# Patient Record
Sex: MALE | Race: WHITE | NOT HISPANIC OR LATINO | Employment: PART TIME | URBAN - METROPOLITAN AREA
[De-identification: names, ages, dates, MRNs, and addresses within clinical notes are randomized per-mention and may not be internally consistent; named-entity substitution may affect disease eponyms.]

---

## 2022-08-02 ENCOUNTER — OFFICE VISIT (OUTPATIENT)
Dept: HEMATOLOGY/ONCOLOGY | Facility: CLINIC | Age: 67
End: 2022-08-02
Payer: MEDICARE

## 2022-08-02 VITALS
WEIGHT: 182.5 LBS | HEART RATE: 63 BPM | TEMPERATURE: 98 F | OXYGEN SATURATION: 99 % | DIASTOLIC BLOOD PRESSURE: 83 MMHG | SYSTOLIC BLOOD PRESSURE: 164 MMHG | RESPIRATION RATE: 18 BRPM

## 2022-08-02 DIAGNOSIS — D75.1 SECONDARY ERYTHROCYTOSIS: ICD-10-CM

## 2022-08-02 DIAGNOSIS — G47.33 OBSTRUCTIVE SLEEP APNEA: ICD-10-CM

## 2022-08-02 PROCEDURE — 99214 PR OFFICE/OUTPT VISIT, EST, LEVL IV, 30-39 MIN: ICD-10-PCS | Mod: ,,, | Performed by: STUDENT IN AN ORGANIZED HEALTH CARE EDUCATION/TRAINING PROGRAM

## 2022-08-02 PROCEDURE — 99214 OFFICE O/P EST MOD 30 MIN: CPT | Mod: ,,, | Performed by: STUDENT IN AN ORGANIZED HEALTH CARE EDUCATION/TRAINING PROGRAM

## 2022-08-02 RX ORDER — METOPROLOL SUCCINATE 50 MG/1
50 TABLET, EXTENDED RELEASE ORAL DAILY
COMMUNITY
Start: 2022-06-25

## 2022-08-02 RX ORDER — LISINOPRIL 10 MG/1
10 TABLET ORAL DAILY
COMMUNITY
Start: 2022-06-25

## 2022-08-02 RX ORDER — HYDROCODONE BITARTRATE AND ACETAMINOPHEN 10; 325 MG/1; MG/1
1 TABLET ORAL EVERY 6 HOURS PRN
COMMUNITY
Start: 2022-07-08

## 2022-08-02 RX ORDER — ROSUVASTATIN CALCIUM 40 MG/1
40 TABLET, COATED ORAL DAILY
COMMUNITY
Start: 2022-06-27

## 2022-08-02 RX ORDER — EMPAGLIFLOZIN 10 MG/1
10 TABLET, FILM COATED ORAL DAILY
COMMUNITY
Start: 2022-06-27

## 2022-08-02 RX ORDER — EZETIMIBE 10 MG/1
5 TABLET ORAL DAILY
COMMUNITY
Start: 2022-04-26

## 2022-08-02 RX ORDER — CLOPIDOGREL BISULFATE 75 MG/1
75 TABLET ORAL DAILY
COMMUNITY
Start: 2022-06-25

## 2022-08-02 NOTE — PROGRESS NOTES
Chief complaint: polycythemia    HPI: 65 y/o M w/ PMHx of HTN, HLD, DM, CAD, TATIANA not on CPAP referred ot [facility] for polycythemia    Colonoscopy Dr Perales at age 52. Cologuard 2018  On initial visit with our clinic 4/2021 his wife reported he snored and had moments of gasping for air during sleep. He also had some pauses during sleep. He had not used CPAP for nearly 8 years. Of note, blood work that showed erythrocytosis was drawn while pt was ill. It was about 5-7 days after J&J covid19 vaccine, he felt weak, dizzy and fatigued, was in bed all day with minimal food and fluid intake.    Today 08/02/2022, patient denies any acute concerns.  He denies any new medications since his last follow-up visit with us.  He denies any symptoms of headaches, vision changes, dizziness, focal weakness, redness or burning of his extremities.    PMHx: HTN, HLD, DM, CAD, TATIANA not on CPAP, lyme disease  PSHx: PCI x2 LAD in 2014, tonsillectomy/adenoidectomy  Social Hx: former smoker 1ppd, quit in 1983, only smoked for 3 years, no ETOH, no drugs  Family Hx: Paternal uncle: unknown cancer  Meds: reviewed  Allergies: NDKA    Labs:  07/26/2022:  WBC 6 0.6 seconds, hemoglobin 16.2, MCV 91.5, platelet 265, .   3/29/22 WBC 6.98, Hgb 15.8, Hct 46.2, ,   10/26/21 Cr 1.03 Alb 4.2 TP 7.7 AlkPhos 23 Tbili 1.8 PSA 3.35 A1C 5.8 WBC 7.39 RBC 5.49 Hg 17.4 HCT 50.9  ANC 3.78  7/27/21 Cr 1.02, Ab 4.1, WBC 6.69, RBC 5.88, Hg 17.9, MCV 91.2, RDW 12.3, , ANC 3.41, , Retic 1.31  4/23/21 Peripheral blood: karyotype 46XY, FISH MPN negative (BCR/ABL, FGDGR1, PDGFRA, PDGFRB, CEP8, CEP9, 20q, CEP7, 13q, 4q).  neg, JAK2 V617F neg, Expanded JAK2 neg, CALR neg  4/23/21 Cr 0.81 Ab 4 WBC 5.48 RBC 5.37 Hg 16.9 MCV 91.4  ANC 2.68 Retic abs 84k Test 529 SPEP w/ MYNOR normal  4/7/21 WBC 5.34 RBC 5.15 Hg 16.23 HCT 48.3 MCV 93.8 RDW 13.5  ANC 2.4 ALC 2.12 AMC 0.68  3/17/21 JAK2 exon 12 neg EPO level 5 Cr 0.88  Alb 4.5 TP 7.9 Ca 9.8 AlkPhos 25 AST 28 ALT 56 TSH 1.62 WBC 9.99 Hg 18.7 RBC 6.01 HCT 55 MCV 91.5 RDW 12.7   10/13/17 WBC 6.7 Hg 16.4   8/13/14 WBC 12.7 Hg 15     Imaging:  3/17/21 CT head non contrast: unremarkable  12/8/20 MRI C spine w/o contrast: left paracentral to lateral disc herniation C5-6 mildly narrowing the left lateral recess and mod narrowing the left lateral foramen. Minimal left paracentral disc protrusion/herniation at C7=-TR1 mildly impinging on thecal space in left lateral recess    Path: None     Review of Systems     CONSTITUTIONAL: no fevers, no chills, no weight loss, no fatigue, no weakness  HEMATOLOGIC: no abnormal bleeding, no abnormal bruising, no drenching night sweats  ONCOLOGIC: no new masses or lumps  HEENT: no vision loss, no tinnitus or hearing loss, no nose bleeding, no dysphagia, no odynophagia  CVS: no chest pain, no palpitations, no dyspnea on exertion  RESP: no shortness of breath, no hemoptysis, no cough  GI: no nausea, no vomiting, no diarrhea, no constipation, no melena, no hematochezia, no hematemesis, no abdominal pain, no increase in abdominal girth  : no dysuria, no hematuria, no hesitancy, no scrotal swelling, no discharge  INTEGUMENT: no rashes, no abnormal bruising, no nail pitting, no hyperpigmentation  NEURO: no falls, no memory loss, no paresthesias or dysesthesias, no urofecal incontinence or retention, no loss of strength on any extremity  MSK: no back pain, no new joint pain, no joint swelling  PSYCH: no suicidal or homicidal ideation, no depression, no insomnia, no anhedonia  ENDOCRINE: no heat or cold intolerance, no polyuria, no polydipsia     Physical Exam   Vitals:    08/02/22 1408   BP: (!) 164/83   Pulse: 63   Resp: 18   Temp: 98 °F (36.7 °C)       ECOG PS 0  GA: AAOx3, NAD  HEENT: NCAT, PERRLA, EOMI, good dentition, no oral ulcers, short neck, pharyngeal crowding.  LYMPH: no cervical, axillary or supraclavicular adenopathy  CVS:  s1s2 RRR, no M/R/G  RESP: CTA b/l, no crackles, no wheezes or rhonchi  ABD: soft, NT, ND, BS+, no hepatosplenomegaly  EXT: no deformities, no pedal edema  SKIN: no rashes, no bruises or purpura, warm and dry  NEURO: normal mentation, strength 5/5 on all 4 extremities, no sensory deficits       Assessment/Plan     1. Polycythemia, reactive.  MPN testing including karyotype, FISH and CALR, MPL, JAK2 V617 and JAK2 Ex 12-15 negative. Other workup including SPEP, MYNOR, sflc, Testosterone level all WNL  Etiology of polycythemia is likely TATIANA while not using CPAP. Essentially secondary polycythemia  Other possible etiology of polycythemia may include Jardiance (common side effect is hemoconcentration).  No strict guidelines for phlebotomy in secondary erythrocytosis but will likely phlebotomize for Hg >18 g/dl  He has been compliant with CPAP, hgb WNL   He is already on dual antiplatelets for CAD    2. Sleep apnea G47.30 Extended hx of TATIANA, c/w use of CPAP at night      Plan  # CBC, CMP in 6 months  # follow-up in clinic in 6 months to discuss above workup and further disease management.

## 2023-01-31 ENCOUNTER — OFFICE VISIT (OUTPATIENT)
Dept: HEMATOLOGY/ONCOLOGY | Facility: CLINIC | Age: 68
End: 2023-01-31
Payer: MEDICARE

## 2023-01-31 VITALS
DIASTOLIC BLOOD PRESSURE: 80 MMHG | TEMPERATURE: 97 F | OXYGEN SATURATION: 97 % | SYSTOLIC BLOOD PRESSURE: 128 MMHG | HEIGHT: 66 IN | RESPIRATION RATE: 18 BRPM | BODY MASS INDEX: 30.52 KG/M2 | HEART RATE: 62 BPM | WEIGHT: 189.88 LBS

## 2023-01-31 DIAGNOSIS — G47.33 OBSTRUCTIVE SLEEP APNEA: ICD-10-CM

## 2023-01-31 DIAGNOSIS — D75.1 SECONDARY ERYTHROCYTOSIS: Primary | ICD-10-CM

## 2023-01-31 PROCEDURE — 99214 OFFICE O/P EST MOD 30 MIN: CPT | Mod: ,,,

## 2023-01-31 PROCEDURE — 99214 PR OFFICE/OUTPT VISIT, EST, LEVL IV, 30-39 MIN: ICD-10-PCS | Mod: ,,,

## 2023-01-31 RX ORDER — ASPIRIN 81 MG/1
81 TABLET ORAL DAILY
COMMUNITY

## 2023-01-31 RX ORDER — AMOXICILLIN 500 MG
CAPSULE ORAL DAILY
COMMUNITY

## 2023-01-31 NOTE — PROGRESS NOTES
Chief complaint: polycythemia    HPI: 65 y/o M w/ PMHx of HTN, HLD, DM, CAD, TATIANA not on CPAP referred ot [facility] for polycythemia    Colonoscopy Dr Perales at age 52. [place] 2018  On initial visit with our clinic 4/2021 his wife reported he snored and had moments of gasping for air during sleep. He also had some pauses during sleep. He had not used CPAP for nearly 8 years. Of note, blood work that showed erythrocytosis was drawn while pt was ill. It was about 5-7 days after J&J covid19 vaccine, he felt weak, dizzy and fatigued, was in bed all day with minimal food and fluid intake.    Today 01/31/23, patient denies any acute concerns.  He denies any new medications since his last follow-up visit with us.  He denies any symptoms of headaches, vision changes, dizziness, focal weakness, redness or burning of his extremities. He reports nightly use of his CPAP.    PMHx: HTN, HLD, DM, CAD, TATIANA not on CPAP, lyme disease  PSHx: PCI x2 LAD in 2014, tonsillectomy/adenoidectomy  Social Hx: former smoker 1ppd, quit in 1983, only smoked for 3 years, no ETOH, no drugs  Family Hx: Paternal uncle: unknown cancer  Meds: reviewed  Allergies: NDKA    Labs:  01/24/23: Cr 1.07, ac 9.1, alb 4.2, alkphos 25, TB 2.0, LFTs WNL, wbc 7.00, hgb 17.4, HCT 52.1, plt 229, ANC 6.31  07/26/2022:  WBC 6 0.6 seconds, hemoglobin 16.2, MCV 91.5, platelet 265, .   3/29/22 WBC 6.98, Hgb 15.8, Hct 46.2, ,   10/26/21 Cr 1.03 Alb 4.2 TP 7.7 AlkPhos 23 Tbili 1.8 PSA 3.35 A1C 5.8 WBC 7.39 RBC 5.49 Hg 17.4 HCT 50.9  ANC 3.78  7/27/21 Cr 1.02, Ab 4.1, WBC 6.69, RBC 5.88, Hg 17.9, MCV 91.2, RDW 12.3, , ANC 3.41, , Retic 1.31  4/23/21 Peripheral blood: karyotype 46XY, FISH MPN negative (BCR/ABL, FGDGR1, PDGFRA, PDGFRB, CEP8, CEP9, 20q, CEP7, 13q, 4q).  neg, JAK2 V617F neg, Expanded JAK2 neg, CALR neg  4/23/21 Cr 0.81 Ab 4 WBC 5.48 RBC 5.37 Hg 16.9 MCV 91.4  ANC 2.68 Retic abs 84k Test 529 SPEP w/ MYNOR  normal  4/7/21 WBC 5.34 RBC 5.15 Hg 16.23 HCT 48.3 MCV 93.8 RDW 13.5  ANC 2.4 ALC 2.12 AMC 0.68  3/17/21 JAK2 exon 12 neg EPO level 5 Cr 0.88 Alb 4.5 TP 7.9 Ca 9.8 AlkPhos 25 AST 28 ALT 56 TSH 1.62 WBC 9.99 Hg 18.7 RBC 6.01 HCT 55 MCV 91.5 RDW 12.7   10/13/17 WBC 6.7 Hg 16.4   8/13/14 WBC 12.7 Hg 15     Imaging:  3/17/21 CT head non contrast: unremarkable  12/8/20 MRI C spine w/o contrast: left paracentral to lateral disc herniation C5-6 mildly narrowing the left lateral recess and mod narrowing the left lateral foramen. Minimal left paracentral disc protrusion/herniation at C7=-TR1 mildly impinging on thecal space in left lateral recess    Path: None     Review of Systems     CONSTITUTIONAL: no fevers, no chills, no weight loss, no fatigue, no weakness  HEMATOLOGIC: no abnormal bleeding, no abnormal bruising, no drenching night sweats  ONCOLOGIC: no new masses or lumps  HEENT: no vision loss, no tinnitus or hearing loss, no nose bleeding, no dysphagia, no odynophagia  CVS: no chest pain, no palpitations, no dyspnea on exertion  RESP: no shortness of breath, no hemoptysis, no cough  GI: no nausea, no vomiting, no diarrhea, no constipation, no melena, no hematochezia, no hematemesis, no abdominal pain, no increase in abdominal girth  : no dysuria, no hematuria, no hesitancy, no scrotal swelling, no discharge  INTEGUMENT: no rashes, no abnormal bruising, no nail pitting, no hyperpigmentation  NEURO: no falls, no memory loss, no paresthesias or dysesthesias, no urofecal incontinence or retention, no loss of strength on any extremity  MSK: no back pain, no new joint pain, no joint swelling  PSYCH: no suicidal or homicidal ideation, no depression, no insomnia, no anhedonia  ENDOCRINE: no heat or cold intolerance, no polyuria, no polydipsia     Physical Exam   Vitals:    01/31/23 1356   BP: 128/80   Pulse: 62   Resp: 18   Temp: 97.4 °F (36.3 °C)         ECOG PS 0  GA: AAOx3, NAD  HEENT: NCAT,  PERRLA, EOMI, good dentition, no oral ulcers, short neck, pharyngeal crowding.  LYMPH: no cervical, axillary or supraclavicular adenopathy  CVS: s1s2 RRR, no M/R/G  RESP: CTA b/l, no crackles, no wheezes or rhonchi  ABD: soft, NT, ND, BS+, no hepatosplenomegaly  EXT: no deformities, no pedal edema  SKIN: no rashes, no bruises or purpura, warm and dry  NEURO: normal mentation, strength 5/5 on all 4 extremities, no sensory deficits       Assessment/Plan     1. Polycythemia, reactive.  MPN testing including karyotype, FISH and CALR, MPL, JAK2 V617 and JAK2 Ex 12-15 negative. Other workup including SPEP, MYNOR, sflc, Testosterone level all WNL  Etiology of polycythemia is likely TATIANA while not using CPAP. Essentially secondary polycythemia  Other possible etiology of polycythemia may include Jardiance (common side effect is hemoconcentration).  No strict guidelines for phlebotomy in secondary erythrocytosis but will likely phlebotomize for Hg >18 g/dl  He has been compliant with CPAP, hgb WNL   He is already on dual antiplatelets for CAD    2. Sleep apnea G47.30 Extended hx of TATIANA, c/w use of CPAP at night      Plan  CBC, CMP in 6 months  Continue w/ CPAP nightly

## 2023-08-04 ENCOUNTER — OFFICE VISIT (OUTPATIENT)
Dept: HEMATOLOGY/ONCOLOGY | Facility: CLINIC | Age: 68
End: 2023-08-04
Payer: MEDICARE

## 2023-08-04 VITALS — WEIGHT: 178 LBS | BODY MASS INDEX: 28.61 KG/M2 | HEIGHT: 66 IN

## 2023-08-04 DIAGNOSIS — G47.33 OBSTRUCTIVE SLEEP APNEA: ICD-10-CM

## 2023-08-04 DIAGNOSIS — D75.1 SECONDARY ERYTHROCYTOSIS: Primary | ICD-10-CM

## 2023-08-04 PROCEDURE — 99442 PR PHYSICIAN TELEPHONE EVALUATION 11-20 MIN: CPT | Mod: 95,,,

## 2023-08-04 PROCEDURE — 99442 PR PHYSICIAN TELEPHONE EVALUATION 11-20 MIN: ICD-10-PCS | Mod: 95,,,

## 2023-08-04 NOTE — PROGRESS NOTES
Established Patient - Audio Only Telehealth Visit     The patient location is: Home  The chief complaint leading to consultation is: Reactive polycythemia secondary to TATIANA  Visit type: Virtual visit with audio only (telephone)  Total time spent with patient: 15 minutes       The reason for the audio only service rather than synchronous audio and video virtual visit was related to technical difficulties or patient preference/necessity.     Each patient to whom I provide medical services by telemedicine is:  (1) informed of the relationship between the physician and patient and the respective role of any other health care provider with respect to management of the patient; and (2) notified that they may decline to receive medical services by telemedicine and may withdraw from such care at any time. Patient verbally consented to receive this service via voice-only telephone call.         Chief complaint: polycythemia    HPI: 65 y/o M w/ PMHx of HTN, HLD, DM, CAD, TATIANA not on CPAP referred ot [facility] for polycythemia    Colonoscopy Dr Perales at age 52. Cologuard 2018  On initial visit with our clinic 4/2021 his wife reported he snored and had moments of gasping for air during sleep. He also had some pauses during sleep. He had not used CPAP for nearly 8 years. Of note, blood work that showed erythrocytosis was drawn while pt was ill. It was about 5-7 days after J&J covid19 vaccine, he felt weak, dizzy and fatigued, was in bed all day with minimal food and fluid intake.    Today 08/04/23, patient denies any acute concerns.  He denies any new medications since his last follow-up visit with us.  He denies any symptoms of headaches, vision changes, dizziness, focal weakness, redness or burning of his extremities. He reports nightly use of his CPAP.    PMHx: HTN, HLD, DM, CAD, TATIANA not on CPAP, lyme disease  PSHx: PCI x2 LAD in 2014, tonsillectomy/adenoidectomy  Social Hx: former smoker 1ppd, quit in 1983, only smoked for 3  years, no ETOH, no drugs  Family Hx: Paternal uncle: unknown cancer  Meds: reviewed  Allergies: NDKA    Labs:  08/01/23: CMP unremarkable, wbc 7.16, hgb 16.5, plt 246  01/24/23: Cr 1.07, ac 9.1, alb 4.2, alkphos 25, TB 2.0, LFTs WNL, wbc 7.00, hgb 17.4, HCT 52.1, plt 229, ANC 6.31  07/26/2022:  WBC 6 0.6 seconds, hemoglobin 16.2, MCV 91.5, platelet 265, .   3/29/22 WBC 6.98, Hgb 15.8, Hct 46.2, ,   10/26/21 Cr 1.03 Alb 4.2 TP 7.7 AlkPhos 23 Tbili 1.8 PSA 3.35 A1C 5.8 WBC 7.39 RBC 5.49 Hg 17.4 HCT 50.9  ANC 3.78  7/27/21 Cr 1.02, Ab 4.1, WBC 6.69, RBC 5.88, Hg 17.9, MCV 91.2, RDW 12.3, , ANC 3.41, , Retic 1.31  4/23/21 Peripheral blood: karyotype 46XY, FISH MPN negative (BCR/ABL, FGDGR1, PDGFRA, PDGFRB, CEP8, CEP9, 20q, CEP7, 13q, 4q).  neg, JAK2 V617F neg, Expanded JAK2 neg, CALR neg  4/23/21 Cr 0.81 Ab 4 WBC 5.48 RBC 5.37 Hg 16.9 MCV 91.4  ANC 2.68 Retic abs 84k Test 529 SPEP w/ MYNOR normal  4/7/21 WBC 5.34 RBC 5.15 Hg 16.23 HCT 48.3 MCV 93.8 RDW 13.5  ANC 2.4 ALC 2.12 AMC 0.68  3/17/21 JAK2 exon 12 neg EPO level 5 Cr 0.88 Alb 4.5 TP 7.9 Ca 9.8 AlkPhos 25 AST 28 ALT 56 TSH 1.62 WBC 9.99 Hg 18.7 RBC 6.01 HCT 55 MCV 91.5 RDW 12.7   10/13/17 WBC 6.7 Hg 16.4   8/13/14 WBC 12.7 Hg 15     Imaging:  3/17/21 CT head non contrast: unremarkable  12/8/20 MRI C spine w/o contrast: left paracentral to lateral disc herniation C5-6 mildly narrowing the left lateral recess and mod narrowing the left lateral foramen. Minimal left paracentral disc protrusion/herniation at C7=-TR1 mildly impinging on thecal space in left lateral recess    Path: None     Review of Systems     CONSTITUTIONAL: no fevers, no chills, no weight loss, no fatigue, no weakness  HEMATOLOGIC: no abnormal bleeding, no abnormal bruising, no drenching night sweats  ONCOLOGIC: no new masses or lumps  HEENT: no vision loss, no tinnitus or hearing loss, no nose bleeding, no dysphagia,  no odynophagia  CVS: no chest pain, no palpitations, no dyspnea on exertion  RESP: no shortness of breath, no hemoptysis, no cough  GI: no nausea, no vomiting, no diarrhea, no constipation, no melena, no hematochezia, no hematemesis, no abdominal pain, no increase in abdominal girth  : no dysuria, no hematuria, no hesitancy, no scrotal swelling, no discharge  INTEGUMENT: no rashes, no abnormal bruising, no nail pitting, no hyperpigmentation  NEURO: no falls, no memory loss, no paresthesias or dysesthesias, no urofecal incontinence or retention, no loss of strength on any extremity  MSK: no back pain, no new joint pain, no joint swelling  PSYCH: no suicidal or homicidal ideation, no depression, no insomnia, no anhedonia  ENDOCRINE: no heat or cold intolerance, no polyuria, no polydipsia     Physical Exam   There were no vitals filed for this visit.        ECOG PS 0  GA: AAOx3, NAD  HEENT: NCAT, PERRLA, EOMI, good dentition, no oral ulcers, short neck, pharyngeal crowding.  LYMPH: no cervical, axillary or supraclavicular adenopathy  CVS: s1s2 RRR, no M/R/G  RESP: CTA b/l, no crackles, no wheezes or rhonchi  ABD: soft, NT, ND, BS+, no hepatosplenomegaly  EXT: no deformities, no pedal edema  SKIN: no rashes, no bruises or purpura, warm and dry  NEURO: normal mentation, strength 5/5 on all 4 extremities, no sensory deficits       Assessment/Plan     1. Polycythemia, reactive.  MPN testing including karyotype, FISH and CALR, MPL, JAK2 V617 and JAK2 Ex 12-15 negative. Other workup including SPEP, MYNOR, sflc, Testosterone level all WNL  Etiology of polycythemia is likely TATIANA while not using CPAP. Essentially secondary polycythemia  Other possible etiology of polycythemia may include Jardiance (common side effect is hemoconcentration).  No strict guidelines for phlebotomy in secondary erythrocytosis but will likely phlebotomize for Hg >18 g/dl  He has been compliant with CPAP, hgb WNL   He is already on dual antiplatelets  for CAD    2. Sleep apnea G47.30 Extended hx of TATIANA, c/w use of CPAP at night      Plan  RTC 6 months w/ cbc cmp  Recommend he continue w/ CPAP nightly               This service was not originating from a related E/M service provided within the previous 7 days nor will  to an E/M service or procedure within the next 24 hours or my soonest available appointment.  Prevailing standard of care was able to be met in this audio-only visit.

## 2024-02-15 ENCOUNTER — OFFICE VISIT (OUTPATIENT)
Dept: HEMATOLOGY/ONCOLOGY | Facility: CLINIC | Age: 69
End: 2024-02-15
Payer: MEDICARE

## 2024-02-15 VITALS
HEART RATE: 60 BPM | RESPIRATION RATE: 18 BRPM | SYSTOLIC BLOOD PRESSURE: 128 MMHG | WEIGHT: 186 LBS | DIASTOLIC BLOOD PRESSURE: 77 MMHG | TEMPERATURE: 98 F | BODY MASS INDEX: 29.89 KG/M2 | OXYGEN SATURATION: 96 % | HEIGHT: 66 IN

## 2024-02-15 DIAGNOSIS — G47.33 OBSTRUCTIVE SLEEP APNEA: ICD-10-CM

## 2024-02-15 DIAGNOSIS — D75.1 SECONDARY ERYTHROCYTOSIS: Primary | ICD-10-CM

## 2024-02-15 PROCEDURE — 99213 OFFICE O/P EST LOW 20 MIN: CPT | Mod: ,,, | Performed by: STUDENT IN AN ORGANIZED HEALTH CARE EDUCATION/TRAINING PROGRAM

## 2024-02-15 NOTE — PROGRESS NOTES
Chief complaint:   Chief Complaint   Patient presents with    Follow-up         HPI: 65 y/o M w/ PMHx of HTN, HLD, DM, CAD, TATIANA not on CPAP referred ot [facility] for polycythemia    Colonoscopy Dr Perales at age 52. Cologuard 2018  On initial visit with our clinic 4/2021 his wife reported he snored and had moments of gasping for air during sleep. He also had some pauses during sleep. He had not used CPAP for nearly 8 years. Of note, blood work that showed erythrocytosis was drawn while pt was ill. It was about 5-7 days after J&J covid19 vaccine, he felt weak, dizzy and fatigued, was in bed all day with minimal food and fluid intake.        Interval history     Today, 02/15/2024, he denies any acute concerns today.  He reports compliance with CPAP.  He notices difference in his energy levels after being compliant with CPAP.  He denies any new medications, ER or hospital visits.  He denies any headaches, vision changes, chest tightness, itching after hot shower or burning numbness in his extremities.    Labs:    02/12/2024 WBC count 6.4, hemoglobin 15.5, hematocrit 46.5, platelet count 229.    08/01/23: CMP unremarkable, wbc 7.16, hgb 16.5, plt 246  01/24/23: Cr 1.07, ac 9.1, alb 4.2, alkphos 25, TB 2.0, LFTs WNL, wbc 7.00, hgb 17.4, HCT 52.1, plt 229, ANC 6.31  07/26/2022:  WBC 6 0.6 seconds, hemoglobin 16.2, MCV 91.5, platelet 265, .   3/29/22 WBC 6.98, Hgb 15.8, Hct 46.2, ,   10/26/21 Cr 1.03 Alb 4.2 TP 7.7 AlkPhos 23 Tbili 1.8 PSA 3.35 A1C 5.8 WBC 7.39 RBC 5.49 Hg 17.4 HCT 50.9  ANC 3.78  7/27/21 Cr 1.02, Ab 4.1, WBC 6.69, RBC 5.88, Hg 17.9, MCV 91.2, RDW 12.3, , ANC 3.41, , Retic 1.31  4/23/21 Peripheral blood: karyotype 46XY, FISH MPN negative (BCR/ABL, FGDGR1, PDGFRA, PDGFRB, CEP8, CEP9, 20q, CEP7, 13q, 4q).  neg, JAK2 V617F neg, Expanded JAK2 neg, CALR neg  4/23/21 Cr 0.81 Ab 4 WBC 5.48 RBC 5.37 Hg 16.9 MCV 91.4  ANC 2.68 Retic abs 84k Test 529 SPEP w/  MYNOR normal  4/7/21 WBC 5.34 RBC 5.15 Hg 16.23 HCT 48.3 MCV 93.8 RDW 13.5  ANC 2.4 ALC 2.12 AMC 0.68  3/17/21 JAK2 exon 12 neg EPO level 5 Cr 0.88 Alb 4.5 TP 7.9 Ca 9.8 AlkPhos 25 AST 28 ALT 56 TSH 1.62 WBC 9.99 Hg 18.7 RBC 6.01 HCT 55 MCV 91.5 RDW 12.7   10/13/17 WBC 6.7 Hg 16.4   8/13/14 WBC 12.7 Hg 15     Imaging:  3/17/21 CT head non contrast: unremarkable  12/8/20 MRI C spine w/o contrast: left paracentral to lateral disc herniation C5-6 mildly narrowing the left lateral recess and mod narrowing the left lateral foramen. Minimal left paracentral disc protrusion/herniation at C7=-TR1 mildly impinging on thecal space in left lateral recess    Path: None     Past Medical History:   Diagnosis Date    Hyperlipidemia     Hypertension        Past Surgical History:   Procedure Laterality Date    ROTATOR CUFF REPAIR Right     SHOULDER SURGERY         Family History   Problem Relation Age of Onset    Heart disease Father        Social History     Socioeconomic History    Marital status:    Tobacco Use    Smoking status: Never    Smokeless tobacco: Never   Substance and Sexual Activity    Alcohol use: Not Currently       Current Outpatient Medications   Medication Sig Dispense Refill    aspirin (ECOTRIN) 81 MG EC tablet Take 81 mg by mouth once daily.      clopidogreL (PLAVIX) 75 mg tablet Take 75 mg by mouth once daily.      ezetimibe (ZETIA) 10 mg tablet Take 5 mg by mouth once daily.      JARDIANCE 10 mg tablet Take 10 mg by mouth once daily.      lisinopriL 10 MG tablet Take 10 mg by mouth once daily.      metoprolol succinate (TOPROL-XL) 50 MG 24 hr tablet Take 50 mg by mouth once daily.      omega-3 fatty acids/fish oil (FISH OIL-OMEGA-3 FATTY ACIDS) 300-1,000 mg capsule Take by mouth once daily.      rosuvastatin (CRESTOR) 40 MG Tab Take 40 mg by mouth once daily.      alendronate-vitamin D3 (FOSAMAX PLUS D) 70 mg- 2,800 unit per tablet Take 1 tablet by mouth every 7 days.       HYDROcodone-acetaminophen (NORCO)  mg per tablet Take 1 tablet by mouth every 6 (six) hours as needed.       No current facility-administered medications for this visit.       Review of patient's allergies indicates:  No Known Allergies      Review of Systems     CONSTITUTIONAL: no fevers, no chills, no weight loss, no fatigue, no weakness  HEMATOLOGIC: no abnormal bleeding, no abnormal bruising, no drenching night sweats  ONCOLOGIC: no new masses or lumps  HEENT: no vision loss, no tinnitus or hearing loss, no nose bleeding, no dysphagia, no odynophagia  CVS: no chest pain, no palpitations, no dyspnea on exertion  RESP: no shortness of breath, no hemoptysis, no cough  GI: no nausea, no vomiting, no diarrhea, no constipation, no melena, no hematochezia, no hematemesis, no abdominal pain, no increase in abdominal girth  : no dysuria, no hematuria, no hesitancy, no scrotal swelling, no discharge  INTEGUMENT: no rashes, no abnormal bruising, no nail pitting, no hyperpigmentation  NEURO: no falls, no memory loss, no paresthesias or dysesthesias, no urofecal incontinence or retention, no loss of strength on any extremity  MSK: no back pain, no new joint pain, no joint swelling  PSYCH: no suicidal or homicidal ideation, no depression, no insomnia, no anhedonia  ENDOCRINE: no heat or cold intolerance, no polyuria, no polydipsia     Physical Exam   Vitals:    02/15/24 1439   BP: 128/77   Pulse: 60   Resp: 18   Temp: 98.4 °F (36.9 °C)       ECOG PS 0  GA: AAOx3, NAD  HEENT: NCAT, PERRLA, EOMI, good dentition, no oral ulcers, short neck, pharyngeal crowding.  LYMPH: no cervical, axillary or supraclavicular adenopathy  CVS: s1s2 RRR, no M/R/G  RESP: CTA b/l, no crackles, no wheezes or rhonchi  ABD: soft, NT, ND, BS+, no hepatosplenomegaly  EXT: no deformities, no pedal edema  SKIN: no rashes, no bruises or purpura, warm and dry  NEURO: normal mentation, strength 5/5 on all 4 extremities, no sensory deficits        Assessment/Plan     1. Polycythemia, reactive.  MPN testing including karyotype, FISH and CALR, MPL, JAK2 V617 and JAK2 Ex 12-15 negative. Other workup including SPEP, MYNOR, sflc, Testosterone level all WNL  Etiology of polycythemia is likely TATIANA while not using CPAP. Essentially secondary polycythemia  No strict guidelines for phlebotomy in secondary erythrocytosis but will likely phlebotomize for Hg >18 g/dl  He has been compliant with CPAP, hgb WNL   He is already on dual antiplatelets for CAD    2. Sleep apnea G47.30 Extended hx of TATIANA, c/w use of CPAP at night      Plan  Patient's erythrocytosis has resolved with CPAP use   Patient can be discharged from our clinic, has close follow-up with PCP   Follow-up in clinic p.r.n..      A total of  20 minutes were spent in review of records, interpretation of test, coordination of care, discussion and counseling with the patient.        Portions of the record may have been created with voice recognition software. Occasional wrong-word or sound-a-like substitutions may have occurred due to the inherent limitations of voice recognition software.

## 2024-09-13 DIAGNOSIS — M71.30 SYNOVIAL CYST: ICD-10-CM

## 2024-09-13 DIAGNOSIS — M48.00 CENTRAL STENOSIS OF SPINAL CANAL: Primary | ICD-10-CM

## 2024-09-16 ENCOUNTER — TELEPHONE (OUTPATIENT)
Dept: NEUROSURGERY | Facility: CLINIC | Age: 69
End: 2024-09-16
Payer: MEDICARE

## 2024-09-16 NOTE — TELEPHONE ENCOUNTER
"I received the referral to Dr. Jones from Dr. Tariq for central stenosis of spinal canal. I spoke with the patient to inquire about the pain he is having. He is C/O lower back pain that radiates down his left leg, into his toes. He has numbness in his toes and a sharp, stabbing pain in his back. The pain is not constant, it really depends on what he is doing. This pain has been going on for quite a while now but has gotten progressively worse in the past month or so. This pain is not due to any accidents/falls. The pain does sometimes wake him up at night, depending what activities he did for that day. When the pain is really bad, he would rate it a 9-10/10 but is usually a 2/10 on a daily basis. He is only taking Aleve to relieve the pain. He has not had any recent testing since MRI in 8/2024. Denies seeing any other doctors for this and has not had any surgeries on his neck/back. He has had a couple of injections in the past but cannot recall who gave them to him. Flores, by reviewing the MRI report, " at L4-L5 there is a minimal to mild disc bulge which is slightly asymmetric to the right and lateral/foraminal, possibly contacting the right exiting nerve root. There is bilateral facet and liagmentum flavum hypertrophy with minimal to mild central canal stenosis. Along the medial aspect of the left facet there is a 5 mm T1 and T2 hyperintense structure felt to be most consistent with a synovial cyst. This may contact the left originating nerve root. " Please review and advise on scheduling. Thank you!  "

## 2024-09-24 ENCOUNTER — OFFICE VISIT (OUTPATIENT)
Dept: NEUROSURGERY | Facility: CLINIC | Age: 69
End: 2024-09-24
Payer: MEDICARE

## 2024-09-24 VITALS
WEIGHT: 186 LBS | DIASTOLIC BLOOD PRESSURE: 74 MMHG | SYSTOLIC BLOOD PRESSURE: 133 MMHG | RESPIRATION RATE: 16 BRPM | HEIGHT: 66 IN | HEART RATE: 55 BPM | BODY MASS INDEX: 29.89 KG/M2

## 2024-09-24 DIAGNOSIS — R29.2 HYPERREFLEXIA OF LOWER EXTREMITY: ICD-10-CM

## 2024-09-24 DIAGNOSIS — M47.816 FACET HYPERTROPHY OF LUMBAR REGION: Primary | ICD-10-CM

## 2024-09-24 DIAGNOSIS — M54.6 CHRONIC BILATERAL THORACIC BACK PAIN: ICD-10-CM

## 2024-09-24 DIAGNOSIS — M48.00 CENTRAL STENOSIS OF SPINAL CANAL: ICD-10-CM

## 2024-09-24 DIAGNOSIS — G89.29 CHRONIC BILATERAL THORACIC BACK PAIN: ICD-10-CM

## 2024-09-24 DIAGNOSIS — M71.30 SYNOVIAL CYST: ICD-10-CM

## 2024-09-24 PROCEDURE — 99204 OFFICE O/P NEW MOD 45 MIN: CPT | Mod: ,,, | Performed by: PHYSICIAN ASSISTANT

## 2024-09-24 RX ORDER — NAPROXEN SODIUM 220 MG
220 TABLET ORAL 2 TIMES DAILY WITH MEALS
COMMUNITY

## 2024-09-24 RX ORDER — LISINOPRIL 40 MG/1
40 TABLET ORAL DAILY
COMMUNITY
Start: 2024-08-20

## 2024-09-24 NOTE — PROGRESS NOTES
[facility]  History & Physical  Neurosurgery      Mauricio Saunders   [id number]   1955       SUBJECTIVE:     CHIEF COMPLAINT:  Back pain      HPI:  Mauricio Saunders is a 69 y.o. right handed male who presents for neurosurgical evaluation.  The patient has been referred to Dr. Jones by Dr. Tariq in regards to lower back pain.  The patient began with lower back pain in 2020 of insidious onset.  [facility]  He received relief of his pain.  In April of this year, the patient experienced recurrent lower back pain.  He complains of pain at the lower thoracic/upper lumbar region.  There is no pain in either lower extremity.  He has numbness in the left 2nd and 3rd toes as well as plantar surface of his foot.  The lower back pain is constant.  Currently, he rates his pain at 4/10.  He has to restrict his activities secondary to pain.  He experienced an exacerbation of pain after working on a tractor.  He has been able to continue working.  He works mostly on a computer.  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  The symptoms are improved with use of heat and pain medication.  He has not undergone a course of physical therapy to this point in time.  He reports to have difficulty initiating flow of urine and maintaining a steady flow.  This began at about the same time that the lower back pain started.  Denies disturbances in bowel function.  He does not have difficulties with his balance.      Past Medical History:   Diagnosis Date    CAD (coronary artery disease)     DM (diabetes mellitus)     Hyperlipidemia     Hypertension     TATIANA (obstructive sleep apnea)     Secondary polycythemia        Past Surgical History:   Procedure Laterality Date    PLACEMENT-STENT      ROTATOR CUFF REPAIR Right     SHOULDER SURGERY      TONSILLECTOMY AND ADENOIDECTOMY         Family History   Problem Relation Name Age of Onset     "Hypertension Mother      Heart disease Father      Hyperlipidemia Father      Heart disease Brother         Social History     Socioeconomic History    Marital status:    Tobacco Use    Smoking status: Former     Types: Cigarettes    Smokeless tobacco: Never   Substance and Sexual Activity    Alcohol use: Not Currently        Review of patient's allergies indicates:  No Known Allergies     Current Outpatient Medications   Medication Instructions    alendronate-vitamin D3 (FOSAMAX PLUS D) 70 mg- 2,800 unit per tablet 1 tablet, Oral, Every 7 days    aspirin (ECOTRIN) 81 mg, Oral, Daily    clopidogreL (PLAVIX) 75 mg, Oral, Daily    ezetimibe (ZETIA) 5 mg, Oral, Daily    JARDIANCE 10 mg, Oral, Daily    lisinopriL (PRINIVIL,ZESTRIL) 40 mg, Oral, Daily    metoprolol succinate (TOPROL-XL) 50 mg, Oral, Daily    naproxen sodium (ANAPROX) 220 mg, Oral, 2 times daily with meals    omega-3 fatty acids/fish oil (FISH OIL-OMEGA-3 FATTY ACIDS) 300-1,000 mg capsule Oral, Daily    rosuvastatin (CRESTOR) 40 mg, Oral, Daily        Review of Systems   Constitutional:  Negative for chills and fever.   HENT:  Negative for nosebleeds and sore throat.    Eyes:  Negative for pain and visual disturbance.   Respiratory:  Negative for cough, chest tightness and shortness of breath.    Cardiovascular:  Negative for chest pain.   Gastrointestinal:  Negative for diarrhea, nausea and vomiting.   Genitourinary:  Positive for difficulty urinating. Negative for dysuria and hematuria.   Musculoskeletal:  Positive for back pain. Negative for gait problem and myalgias.   Skin:  Negative for rash.   Neurological:  Positive for numbness. Negative for dizziness, facial asymmetry, weakness and headaches.   Psychiatric/Behavioral:  Negative for confusion and sleep disturbance. The patient is not nervous/anxious.        OBJECTIVE:       Visit Vitals  /74 (BP Location: Left arm, Patient Position: Sitting)   Pulse (!) 55   Resp 16   Ht 5' 6" (1.676 " m)   Wt 84.4 kg (186 lb)   BMI 30.02 kg/m²        General:  Pleasant, Well-nourished, Well-groomed.    CV:  Neck is supple.  There are no carotid bruits.  Heart has regular rate and rhythm.    Lungs:  Lungs are clear to auscultation bilaterally with non-labored respirations.    Abdomen:  Soft, non-tender, non-distended.    Musculoskeletal:  Lumbar ROM:   full  Straight leg raise is negative bilaterally.  Crossed straight leg raise is negative bilaterally.  Hip rotation is negative bilaterally.    Neurological:    Alert and oriented to person, place, time, and situation.  Muscle strength against resistance:    Iliopsoas Quadriceps Knee  Flexion Tibialis  anterior Gastro- cnemius EHL   Lower: R 5/5 5/5 5/5 5/5 5/5 5/5    L 5/5 5/5 5/5 5/5 5/5 5/5   Sensation is intact to primary modalities in bilateral upper extremities.  Reflexes:   Right Left   Triceps 1+ 1+   Biceps 3+ 2+   Brachioradialis 1+ 1+   Patellar 3+ 3+   Achilles 3+ 3+   Barker's sign is negative bilaterally.  Toes are down going to Babinski.  There is no clonus at the ankles.  Gait is normal.   He was able to walk on heels and toes without difficulty.      Imaging:  All pertinent neuroimaging independently reviewed. Discussed these findings in detail with the patient.  MRI of the lumbar spine without contrast was obtained on 08/26/2024.  This study shows a left synovial cyst at L4-5 which contacts the L5 nerve root.  Lumbar x-rays, three views, from 08/20/2024 shows preserved disc space height through the lumbar spine.  Alignment is normal..    ASSESSMENT:     1. Facet hypertrophy of lumbar region  Ambulatory referral/consult to Pain Clinic    Ambulatory referral/consult to Physical/Occupational Therapy      2. Chronic bilateral thoracic back pain  Ambulatory referral/consult to Pain Clinic    Ambulatory referral/consult to Physical/Occupational Therapy      3. Synovial cyst  Ambulatory referral/consult to Neurosurgery    Ambulatory referral/consult to  Pain Clinic      4. Central stenosis of spinal canal  Ambulatory referral/consult to Neurosurgery        PLAN:     Options were discussed at length with the patient and his wife.  The imaging of the lumbar spine looks very good for a 69-year-old.  He does have facet hypertrophy and a left small synovial cyst at L4-5.  This may be contributing to the numbness that he has in his left foot.  There is no surgery indicated in this case.  I have recommended a course of physical therapy including core strengthening exercises.  In addition, we will have [facility] for [facility] injection being it helped a great deal in the past.    The area of his back pain is more lower thoracic than lower lumbar.  In addition, he was hyperreflexive in his lower extremities.  We will obtain thoracic MRI without contrast to further assess.  He will return for follow-up with that imaging.      A total of 45 minutes was spent face-to-face with the patient during this encounter.  Over half of that time was spent on counseling and coordination of care.  Additional time was used to review the patient's chart including note from Dr. Tariq, lumbar MRI and x-ray images and reports, and work on office note.      Bre Chatterjee PA-C      Disclaimer:  This note is prepared using voice recognition software and as such is likely to have errors despite attempts at proofreading.

## 2024-10-04 ENCOUNTER — TELEPHONE (OUTPATIENT)
Dept: NEUROSURGERY | Facility: CLINIC | Age: 69
End: 2024-10-04
Payer: MEDICARE

## 2024-10-04 NOTE — TELEPHONE ENCOUNTER
It is good that he return in 3 months, December.  That way he will have time to get injections and undergo PT.

## 2024-10-04 NOTE — LETTER
[facility]  [address] [place] [address] DR, [address]  [place] [address]  Phone: [phone] October 4, 2024     Patient: Mauricio Saunders   YOB: 1955   Date of Visit: 10/4/2024       Dear Mr. Saunders:    I have included the handouts for core stretching and strengthening exercises.  It was best to perform the exercises 3 to 4 times a week.  Pick 2 or 3 stretching exercises and strengthening.  You can switch it up from day to day the different exercises available.  Start with the easy strengthening exercises first.    If you have any questions or concerns, please don't hesitate to contact my office.    Sincerely,        Bre Chatterjee PA-C

## 2024-10-04 NOTE — TELEPHONE ENCOUNTER
I contacted the patient.  We reviewed his thoracic MRI.  There is no central or foraminal stenosis.  I have recommended he continue with proposed plan.  He was hoping to have injections done 1st to see how it works prior to going to physical therapy.  I have encouraged him that core strengthening and proper body mechanics are important in keeping his symptoms under control.  He voiced understanding.  I am mailing a packet of exercises to him.    Rylee, he has not [facility] from the [facility].  Please check on that referral.   He has concerns of wanting to know the source of his pain.  Change the follow up appointment to Dr. Jones's schedule.  Thank you

## 2024-12-04 ENCOUNTER — TELEPHONE (OUTPATIENT)
Dept: NEUROSURGERY | Facility: CLINIC | Age: 69
End: 2024-12-04
Payer: MEDICARE

## 2024-12-04 NOTE — TELEPHONE ENCOUNTER
I spoke with the patient to advise him of Bre's recommendations. He would like to F/U with the [facility] at [facility] and will give us a call back once he is ready to r/s.

## 2024-12-04 NOTE — TELEPHONE ENCOUNTER
I think he can come in to see Dr. Jones.  He has been treated with injections.     If the patient prefers to continue to work with the physician at [facility] rather than coming in to see Dr. Jones, we can cancel that appointment.  He can call if he would like to be seen.

## 2024-12-04 NOTE — TELEPHONE ENCOUNTER
I spoke with the patient to see if he has had his injections with the [facility] yet. He stated he saw them about 2 weeks ago and just recently seen them Monday. He has had 2 injections so far. I asked him if he had began PT yet so I can try to obtain those records prior to his upcoming appointment with Dr. Jones for 12/19/24. He stated he is holding off on PT until he sees the MD at the [facility] in 6 weeks. He is wanting to see what that MD advises to do moving forward. Bre, is it okay to still proceed with the appointment with Dr. Jones on 12/19/24? Of note, [facility] is faxing over records now